# Patient Record
Sex: FEMALE | Race: WHITE | Employment: FULL TIME | URBAN - METROPOLITAN AREA
[De-identification: names, ages, dates, MRNs, and addresses within clinical notes are randomized per-mention and may not be internally consistent; named-entity substitution may affect disease eponyms.]

---

## 2020-12-01 ENCOUNTER — TRANSCRIBE ORDER (OUTPATIENT)
Dept: SCHEDULING | Age: 20
End: 2020-12-01

## 2020-12-01 DIAGNOSIS — R13.10 DYSPHAGIA: ICD-10-CM

## 2020-12-01 DIAGNOSIS — R10.11 RIGHT UPPER QUADRANT PAIN: Primary | ICD-10-CM

## 2020-12-01 DIAGNOSIS — R11.2 NAUSEA WITH VOMITING: ICD-10-CM

## 2020-12-01 DIAGNOSIS — R11.10 REGURGITATION AND RECHEWING: ICD-10-CM

## 2020-12-14 ENCOUNTER — HOSPITAL ENCOUNTER (OUTPATIENT)
Dept: NUCLEAR MEDICINE | Age: 20
Discharge: HOME | End: 2020-12-14
Attending: PHYSICIAN ASSISTANT
Payer: COMMERCIAL

## 2020-12-14 ENCOUNTER — HOSPITAL ENCOUNTER (OUTPATIENT)
Dept: ULTRASOUND IMAGING | Age: 20
Discharge: HOME | End: 2020-12-14
Attending: PHYSICIAN ASSISTANT
Payer: COMMERCIAL

## 2020-12-14 DIAGNOSIS — R10.11 RIGHT UPPER QUADRANT PAIN: ICD-10-CM

## 2020-12-14 DIAGNOSIS — R11.10 REGURGITATION AND RECHEWING: ICD-10-CM

## 2020-12-14 DIAGNOSIS — R13.10 DYSPHAGIA: ICD-10-CM

## 2020-12-14 DIAGNOSIS — R11.2 NAUSEA WITH VOMITING: ICD-10-CM

## 2020-12-14 PROCEDURE — 76700 US EXAM ABDOM COMPLETE: CPT

## 2020-12-14 PROCEDURE — 78226 HEPATOBILIARY SYSTEM IMAGING: CPT

## 2021-10-19 VITALS — HEIGHT: 69 IN | BODY MASS INDEX: 31.1 KG/M2 | WEIGHT: 210 LBS

## 2021-10-19 PROBLEM — Z98.890 H/O ARTHROSCOPY OF SHOULDER: Status: ACTIVE | Noted: 2021-08-24

## 2021-11-09 ENCOUNTER — OFFICE VISIT (OUTPATIENT)
Dept: ORTHOPEDIC SURGERY | Age: 21
End: 2021-11-09
Payer: OTHER MISCELLANEOUS

## 2021-11-09 DIAGNOSIS — Z98.890 STATUS POST ARTHROSCOPY OF LEFT SHOULDER: ICD-10-CM

## 2021-11-09 DIAGNOSIS — Z98.890 H/O ARTHROSCOPY OF SHOULDER: Primary | ICD-10-CM

## 2021-11-09 PROCEDURE — 99024 POSTOP FOLLOW-UP VISIT: CPT | Performed by: ORTHOPAEDIC SURGERY

## 2021-11-09 NOTE — PROGRESS NOTES
Kaushal Rios (: 2000) is a 19 y.o. female, patient, here for evaluation of the following chief complaint(s):  No chief complaint on file. HPI:    Patient returns to the office now status post left shoulder arthroscopy. She is doing well. Her pain is well controlled. She is advancing with physical therapy. Allergies   Allergen Reactions    Amoxicillin Unable to Obtain    Suprax [Cefixime] Unable to Obtain    Zithromax [Azithromycin] Unable to Obtain       No current outpatient medications on file. No current facility-administered medications for this visit. Past Medical History:   Diagnosis Date    Allergy     Anemia     Anxiety disorder     Gastroesophageal reflux     Migraine         Past Surgical History:   Procedure Laterality Date    HX SHOULDER ARTHROSCOPY Left 2021       Family History   Problem Relation Age of Onset    Cancer Father     Depression Father     Heart Disease Father     Hypertension Father     Thyroid Disease Father     Depression Sister         Social History     Socioeconomic History    Marital status: SINGLE     Spouse name: Not on file    Number of children: Not on file    Years of education: Not on file    Highest education level: Not on file   Occupational History    Not on file   Tobacco Use    Smoking status: Not on file    Smokeless tobacco: Not on file   Substance and Sexual Activity    Alcohol use: Yes     Comment: 1-2 drinks per occasion    Drug use: Not on file    Sexual activity: Not on file   Other Topics Concern    Not on file   Social History Narrative    Not on file     Social Determinants of Health     Financial Resource Strain:     Difficulty of Paying Living Expenses: Not on file   Food Insecurity:     Worried About Running Out of Food in the Last Year: Not on file    Molly of Food in the Last Year: Not on file   Transportation Needs:     Lack of Transportation (Medical):  Not on file    Lack of Transportation (Non-Medical): Not on file   Physical Activity:     Days of Exercise per Week: Not on file    Minutes of Exercise per Session: Not on file   Stress:     Feeling of Stress : Not on file   Social Connections:     Frequency of Communication with Friends and Family: Not on file    Frequency of Social Gatherings with Friends and Family: Not on file    Attends Adventist Services: Not on file    Active Member of [address] [place] [address] or Organizations: Not on file    Attends Club or Organization Meetings: Not on file    Marital Status: Not on file   Intimate Partner Violence:     Fear of Current or Ex-Partner: Not on file    Emotionally Abused: Not on file    Physically Abused: Not on file    Sexually Abused: Not on file   Housing Stability:     Unable to Pay for Housing in the Last Year: Not on file    Number of [place] in the Last Year: Not on file    Unstable Housing in the Last Year: Not on file       Review of Systems   Constitutional: Negative. HENT: Negative. Eyes: Negative. Respiratory: Negative. Cardiovascular: Negative. Gastrointestinal: Negative. Endocrine: Negative. Genitourinary: Negative. Musculoskeletal: Negative. Shoulder arthroscopy left   Skin: Negative. Allergic/Immunologic: Negative. Neurological: Negative. Hematological: Negative. Psychiatric/Behavioral: Negative. All other systems reviewed and are negative. Vitals: There were no vitals taken for this visit. There is no height or weight on file to calculate BMI. Ortho Exam     Left shoulder: Patient has 160 degrees of forward elevation, 80 degrees of abduction and 55 degrees of external rotation. Internal rotation is to the SI joint. Patient has no pain with manipulation of the shoulder. Neurovascular examination is intact. ASSESSMENT/PLAN:    Patient is progressing well. She is appropriately tight at this stage she is exactly where I want her to be.   I would recommend another 4 weeks of physical therapy to advance her strength and range of motion. Patient is to return to the office in 4 weeks.         Allan Yepez MD

## 2021-11-09 NOTE — LETTER
11/9/2021 9:34 AM    Ms. Johnathon Montoya  [address] November 9, 2021       RE: Johnathon Montoya      To Whom It May Concern,    This is to certify that Johnathon Montoya may may return to work on 11/09/2021. Patient has restrictions of sedentary duty only and no use of left arm    Please feel free to contact my office if you have any questions or concerns. Thank you for your assistance in this matter.       Sincerely,  Mary France            Sincerely,      Beatrice Thomas MD

## 2021-12-07 ENCOUNTER — OFFICE VISIT (OUTPATIENT)
Dept: ORTHOPEDIC SURGERY | Age: 21
End: 2021-12-07
Payer: OTHER MISCELLANEOUS

## 2021-12-07 DIAGNOSIS — Z98.890 STATUS POST ARTHROSCOPY OF LEFT SHOULDER: Primary | ICD-10-CM

## 2021-12-07 PROCEDURE — 99214 OFFICE O/P EST MOD 30 MIN: CPT | Performed by: ORTHOPAEDIC SURGERY

## 2021-12-07 NOTE — PROGRESS NOTES
Merlene Tabor (: 2000) is a 19 y.o. female, patient, here for evaluation of the following chief complaint(s):  Shoulder Pain (left shoulder pain)       HPI:    Patient returns to the office now status post arthroscopy of the shoulder. Patient is doing well. She has had improvement since seen last in the office. Allergies   Allergen Reactions    Amoxicillin Unable to Obtain    Suprax [Cefixime] Unable to Obtain    Zithromax [Azithromycin] Unable to Obtain       No current outpatient medications on file. No current facility-administered medications for this visit. Past Medical History:   Diagnosis Date    Allergy     Anemia     Anxiety disorder     Gastroesophageal reflux     Migraine         Past Surgical History:   Procedure Laterality Date    HX SHOULDER ARTHROSCOPY Left 2021       Family History   Problem Relation Age of Onset    Cancer Father     Depression Father     Heart Disease Father     Hypertension Father     Thyroid Disease Father     Depression Sister         Social History     Socioeconomic History    Marital status: SINGLE     Spouse name: Not on file    Number of children: Not on file    Years of education: Not on file    Highest education level: Not on file   Occupational History    Not on file   Tobacco Use    Smoking status: Not on file    Smokeless tobacco: Not on file   Substance and Sexual Activity    Alcohol use: Yes     Comment: 1-2 drinks per occasion    Drug use: Not on file    Sexual activity: Not on file   Other Topics Concern    Not on file   Social History Narrative    Not on file     Social Determinants of Health     Financial Resource Strain:     Difficulty of Paying Living Expenses: Not on file   Food Insecurity:     Worried About Running Out of Food in the Last Year: Not on file    Molly of Food in the Last Year: Not on file   Transportation Needs:     Lack of Transportation (Medical):  Not on file    Lack of Transportation (Non-Medical): Not on file   Physical Activity:     Days of Exercise per Week: Not on file    Minutes of Exercise per Session: Not on file   Stress:     Feeling of Stress : Not on file   Social Connections:     Frequency of Communication with Friends and Family: Not on file    Frequency of Social Gatherings with Friends and Family: Not on file    Attends Orthodoxy Services: Not on file    Active Member of [address] [place] [address] or Organizations: Not on file    Attends Club or Organization Meetings: Not on file    Marital Status: Not on file   Intimate Partner Violence:     Fear of Current or Ex-Partner: Not on file    Emotionally Abused: Not on file    Physically Abused: Not on file    Sexually Abused: Not on file   Housing Stability:     Unable to Pay for Housing in the Last Year: Not on file    Number of [place] in the Last Year: Not on file    Unstable Housing in the Last Year: Not on file       Review of Systems   Musculoskeletal:        Shoulder pain       Vitals: There were no vitals taken for this visit. There is no height or weight on file to calculate BMI. Ortho Exam     Left shoulder: Portal sites of healed. She has 165 to 170 degrees of forward elevation, 110 degrees lateral duction and 60 degrees of external rotation. She still has appropriate amount of tightness with external rotation. There is no crepitation. Rotator cuff strength is maintained. Neurovascular examination is intact. ASSESSMENT/PLAN:    Patient is progressing well. I would recommend another 4 weeks of physical therapy to advance her range of motion and work on dynamic strengthening. We will increase her work restrictions although she is still not ready to return to full duty work.   Patient is to return to the office in 4 weeks        Naya Yee MD

## 2021-12-07 NOTE — LETTER
12/7/2021 8:29 AM    Ms. Diane Garcia  [address]         Patients Restrictions:    No repetitive lifting      No lifting over 5lbs              Sincerely,      Hernan Duran MD

## 2021-12-14 ENCOUNTER — TELEPHONE (OUTPATIENT)
Dept: ORTHOPEDIC SURGERY | Age: 21
End: 2021-12-14

## 2022-01-04 ENCOUNTER — OFFICE VISIT (OUTPATIENT)
Dept: ORTHOPEDIC SURGERY | Age: 22
End: 2022-01-04
Payer: OTHER MISCELLANEOUS

## 2022-01-04 DIAGNOSIS — G89.29 CHRONIC LEFT SHOULDER PAIN: Primary | ICD-10-CM

## 2022-01-04 DIAGNOSIS — M25.512 CHRONIC LEFT SHOULDER PAIN: Primary | ICD-10-CM

## 2022-01-04 PROCEDURE — 99212 OFFICE O/P EST SF 10 MIN: CPT | Performed by: ORTHOPAEDIC SURGERY

## 2022-01-04 NOTE — PROGRESS NOTES
Jayant Simon (: 2000) is a 19 y.o. female, patient, here for evaluation of the following chief complaint(s):  Shoulder Pain (left shoulder )       HPI:    Patient returns the office now status post stabilization procedure left shoulder. She is doing much better. She has had significant improvement with her therapy over the past month. Allergies   Allergen Reactions    Amoxicillin Unable to Obtain    Suprax [Cefixime] Unable to Obtain    Zithromax [Azithromycin] Unable to Obtain       No current outpatient medications on file. No current facility-administered medications for this visit. Past Medical History:   Diagnosis Date    Allergy     Anemia     Anxiety disorder     Gastroesophageal reflux     Migraine         Past Surgical History:   Procedure Laterality Date    HX SHOULDER ARTHROSCOPY Left 2021       Family History   Problem Relation Age of Onset    Cancer Father     Depression Father     Heart Disease Father     Hypertension Father     Thyroid Disease Father     Depression Sister         Social History     Socioeconomic History    Marital status: SINGLE     Spouse name: Not on file    Number of children: Not on file    Years of education: Not on file    Highest education level: Not on file   Occupational History    Not on file   Tobacco Use    Smoking status: Not on file    Smokeless tobacco: Not on file   Substance and Sexual Activity    Alcohol use: Yes     Comment: 1-2 drinks per occasion    Drug use: Not on file    Sexual activity: Not on file   Other Topics Concern    Not on file   Social History Narrative    Not on file     Social Determinants of Health     Financial Resource Strain:     Difficulty of Paying Living Expenses: Not on file   Food Insecurity:     Worried About Running Out of Food in the Last Year: Not on file    Molly of Food in the Last Year: Not on file   Transportation Needs:     Lack of Transportation (Medical):  Not on file    Lack of Transportation (Non-Medical): Not on file   Physical Activity:     Days of Exercise per Week: Not on file    Minutes of Exercise per Session: Not on file   Stress:     Feeling of Stress : Not on file   Social Connections:     Frequency of Communication with Friends and Family: Not on file    Frequency of Social Gatherings with Friends and Family: Not on file    Attends Pentecostal Services: Not on file    Active Member of Clubs or Organizations: Not on file    Attends Club or Organization Meetings: Not on file    Marital Status: Not on file   Intimate Partner Violence:     Fear of Current or Ex-Partner: Not on file    Emotionally Abused: Not on file    Physically Abused: Not on file    Sexually Abused: Not on file   Housing Stability:     Unable to Pay for Housing in the Last Year: Not on file    Number of [place] in the Last Year: Not on file    Unstable Housing in the Last Year: Not on file       Review of Systems   Musculoskeletal:        Left shoulder pain       Vitals: There were no vitals taken for this visit. There is no height or weight on file to calculate BMI. Ortho Exam     Left shoulder: Patient has 175 degrees of forward elevation, 110 degrees lateral duction and 70 degrees of external rotation. Internal rotation is to L4. She has no pain with manipulation of the shoulder. Rotator cuff strength is maintained with forward elevation, lateral duction and external rotation. There is no crepitation. Neurovascular examination is intact. ASSESSMENT/PLAN:    As anticipated, she is already started the process of regaining the rest of her range of motion. I would suggest another 4 weeks of physical therapy to work on her dynamic strength. We should be able to release her at that time. 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  Patient is to return to the office in 4 weeks        Edwina Castillo MD

## 2022-02-01 ENCOUNTER — OFFICE VISIT (OUTPATIENT)
Dept: ORTHOPEDIC SURGERY | Age: 22
End: 2022-02-01
Payer: OTHER MISCELLANEOUS

## 2022-02-01 DIAGNOSIS — G89.29 CHRONIC LEFT SHOULDER PAIN: Primary | ICD-10-CM

## 2022-02-01 DIAGNOSIS — M25.512 CHRONIC LEFT SHOULDER PAIN: Primary | ICD-10-CM

## 2022-02-01 PROCEDURE — 99212 OFFICE O/P EST SF 10 MIN: CPT | Performed by: ORTHOPAEDIC SURGERY

## 2022-02-01 NOTE — PROGRESS NOTES
Elana Kwon (: 2000) is a 19 y.o. female, patient, here for evaluation of the following chief complaint(s):  Shoulder Pain (shoulder pain)       HPI:    Patient presents the office now status post arthroscopic stabilization of the left shoulder. Patient states she is doing well. She seems to be continue to improve with physical therapy. She still does note some level of tightness and pain in her shoulder but it seems to be improving. Allergies   Allergen Reactions    Amoxicillin Unable to Obtain    Suprax [Cefixime] Unable to Obtain    Zithromax [Azithromycin] Unable to Obtain       No current outpatient medications on file. No current facility-administered medications for this visit.        Past Medical History:   Diagnosis Date    Allergy     Anemia     Anxiety disorder     Gastroesophageal reflux     Migraine         Past Surgical History:   Procedure Laterality Date    HX SHOULDER ARTHROSCOPY Left 2021       Family History   Problem Relation Age of Onset    Cancer Father     Depression Father     Heart Disease Father     Hypertension Father     Thyroid Disease Father     Depression Sister         Social History     Socioeconomic History    Marital status: SINGLE     Spouse name: Not on file    Number of children: Not on file    Years of education: Not on file    Highest education level: Not on file   Occupational History    Not on file   Tobacco Use    Smoking status: Not on file    Smokeless tobacco: Not on file   Substance and Sexual Activity    Alcohol use: Yes     Comment: 1-2 drinks per occasion    Drug use: Not on file    Sexual activity: Not on file   Other Topics Concern    Not on file   Social History Narrative    Not on file     Social Determinants of Health     Financial Resource Strain:     Difficulty of Paying Living Expenses: Not on file   Food Insecurity:     Worried About Running Out of Food in the Last Year: Not on file    Molly of Food in the Last Year: Not on file   Transportation Needs:     Lack of Transportation (Medical): Not on file    Lack of Transportation (Non-Medical): Not on file   Physical Activity:     Days of Exercise per Week: Not on file    Minutes of Exercise per Session: Not on file   Stress:     Feeling of Stress : Not on file   Social Connections:     Frequency of Communication with Friends and Family: Not on file    Frequency of Social Gatherings with Friends and Family: Not on file    Attends Adventism Services: Not on file    Active Member of [address] [place] [address] [employer] or Organizations: Not on file    Attends Club or Organization Meetings: Not on file    Marital Status: Not on file   Intimate Partner Violence:     Fear of Current or Ex-Partner: Not on file    Emotionally Abused: Not on file    Physically Abused: Not on file    Sexually Abused: Not on file   Housing Stability:     Unable to Pay for Housing in the Last Year: Not on file    Number of [place] in the Last Year: Not on file    Unstable Housing in the Last Year: Not on file       Review of Systems   Musculoskeletal:        Shoulder pain       Vitals: There were no vitals taken for this visit. There is no height or weight on file to calculate BMI. Ortho Exam     Left shoulder: Patient has 172 degrees of forward elevation, 110 degrees lateral duction and 72 degrees of external rotation internal rotation is to L3. Patient has very little pain with manipulation of the shoulder. She has a negative jerk test today negative apprehension. Rotator cuff strength is maintained throughout. ASSESSMENT/PLAN:    Patient is doing well. I do not believe she is 100% recovered. I would recommend another 4 weeks of physical therapy. Most likely, she will have reached MMI by that time.   Patient is to return to the office in 4 weeks        Lesley Smith MD

## 2022-03-01 ENCOUNTER — OFFICE VISIT (OUTPATIENT)
Dept: ORTHOPEDIC SURGERY | Age: 22
End: 2022-03-01
Payer: COMMERCIAL

## 2022-03-01 DIAGNOSIS — M25.512 CHRONIC LEFT SHOULDER PAIN: Primary | ICD-10-CM

## 2022-03-01 DIAGNOSIS — G89.29 CHRONIC LEFT SHOULDER PAIN: Primary | ICD-10-CM

## 2022-03-01 PROCEDURE — 99212 OFFICE O/P EST SF 10 MIN: CPT | Performed by: ORTHOPAEDIC SURGERY
